# Patient Record
Sex: MALE | Race: WHITE
[De-identification: names, ages, dates, MRNs, and addresses within clinical notes are randomized per-mention and may not be internally consistent; named-entity substitution may affect disease eponyms.]

---

## 2018-10-04 ENCOUNTER — HOSPITAL ENCOUNTER (OUTPATIENT)
Dept: HOSPITAL 89 - OR | Age: 83
Discharge: HOME | End: 2018-10-04
Attending: UROLOGY
Payer: MEDICARE

## 2018-10-04 VITALS — SYSTOLIC BLOOD PRESSURE: 120 MMHG | DIASTOLIC BLOOD PRESSURE: 65 MMHG

## 2018-10-04 VITALS — DIASTOLIC BLOOD PRESSURE: 91 MMHG | SYSTOLIC BLOOD PRESSURE: 119 MMHG

## 2018-10-04 VITALS — SYSTOLIC BLOOD PRESSURE: 119 MMHG | DIASTOLIC BLOOD PRESSURE: 63 MMHG

## 2018-10-04 VITALS — DIASTOLIC BLOOD PRESSURE: 73 MMHG | SYSTOLIC BLOOD PRESSURE: 132 MMHG

## 2018-10-04 VITALS — WEIGHT: 170 LBS | BODY MASS INDEX: 26.68 KG/M2 | HEIGHT: 67 IN

## 2018-10-04 VITALS — DIASTOLIC BLOOD PRESSURE: 54 MMHG | SYSTOLIC BLOOD PRESSURE: 117 MMHG

## 2018-10-04 VITALS — SYSTOLIC BLOOD PRESSURE: 121 MMHG | DIASTOLIC BLOOD PRESSURE: 83 MMHG

## 2018-10-04 VITALS — DIASTOLIC BLOOD PRESSURE: 74 MMHG | SYSTOLIC BLOOD PRESSURE: 141 MMHG

## 2018-10-04 DIAGNOSIS — R39.198: Primary | ICD-10-CM

## 2018-10-04 PROCEDURE — 52000 CYSTOURETHROSCOPY: CPT

## 2018-10-04 PROCEDURE — 87088 URINE BACTERIA CULTURE: CPT

## 2018-10-04 PROCEDURE — 81001 URINALYSIS AUTO W/SCOPE: CPT

## 2018-10-04 NOTE — FLOCK CYSTOURETHROSCOPY
EVENT DATE: October 4, 2018

SURGEON: STACIE Esquivel MD 

ANESTHESIOLOGIST: Jean Brandon MD

ANESTHESIA: General

ASSISTANT: Staff



PREOPERATIVE DIAGNOSES

1.  Slow difficult urination, etiology ?

2.  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



POSTOPERATIVE DIAGNOSES

1.  Slow difficult urination, etiology ?

2.  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



PROCEDURES PERFORMED

1.  Cystourethroscopy.  

2.  Dilation of bladder neck contracture. 

3.  Hydrodistention of the bladder.





DESCRIPTION OF PROCEDURE

Under general anesthetic, the patient was prepped and draped in the extended 

lithotomy position.  



The 21-Panendoscope was admitted through the urethra and into the bladder.  



There was noted to be bladder neck contracture that calibrated approximately 14-

16 Faroese. 



The diaphragm like circular stricture was soft and dilated easily over the tip 

of the scope.



There was minimal bleeding encountered. 



Bladder showed 4+ trabeculation. 



Trigone and ureteral orifices were normal. 



Bladder filled under gravity flow and measured to a total of approximately 800 

cc. 



On drainage of the bladder, there was no bloody drainage. 



On reinspection of the bladder, there were no glomerulations. 



The scope was withdrawn. 



The bladder neck contracture was dilated to 30-Faroese without any difficulty. 



The #18 catheter passed easily through the urethra into the bladder.



The irrigation was clear at the conclusion of the procedure. 



The patient tolerated the procedure satisfactorily and returned to the recovery 

room in satisfactory condition. 



 This is an 83-year-old white male complaining of slow difficult urination and 

urgency and frequency. 



The patient is status post radical prostatectomy. 



He is status post dilation of bladder neck contracture several years ago. 



Patient has done well following his radical prostatectomy for prostate cancer. 



Suspect probable recurrent bladder neck contracture. The patient was so advised 

and asked to be treated again with dilation as we did before. 



That has been accomplished.  See operative note for details.  



The patient will be ready for discharge home when alert and functional.  He is 

to force fluids 2 liters per day.  Activities are as tolerated.  We plan 

followup on Tuesday, October 9, 2018.  He is to call for an appointment.  He is 

to continue his usual medications.   A copy of the instructions was given to the

patient.  He was given a dose of Toradol in the recovery room. 



The patient will be discharged home on Cipro, Pepcid, Motrin and Tylenol #3 

therapy in addition to his usual medications. 



The patient was instructed on catheter removal in the a.m. as long as everything

is going well. 

DENYS